# Patient Record
Sex: MALE | URBAN - METROPOLITAN AREA
[De-identification: names, ages, dates, MRNs, and addresses within clinical notes are randomized per-mention and may not be internally consistent; named-entity substitution may affect disease eponyms.]

---

## 2022-02-23 ENCOUNTER — OFFICE VISIT (OUTPATIENT)
Dept: URBAN - METROPOLITAN AREA CLINIC 24 | Facility: CLINIC | Age: 52
End: 2022-02-23
Payer: COMMERCIAL

## 2022-02-23 DIAGNOSIS — H25.13 AGE-RELATED NUCLEAR CATARACT, BILATERAL: ICD-10-CM

## 2022-02-23 DIAGNOSIS — H40.013 OPEN ANGLE WITH BORDERLINE FINDINGS, LOW RISK, BILATERAL: Primary | ICD-10-CM

## 2022-02-23 DIAGNOSIS — H40.1131 PRIMARY OPEN-ANGLE GLAUCOMA, BILATERAL, MILD STAGE: ICD-10-CM

## 2022-02-23 PROCEDURE — 99204 OFFICE O/P NEW MOD 45 MIN: CPT | Performed by: OPHTHALMOLOGY

## 2022-02-23 PROCEDURE — 92020 GONIOSCOPY: CPT | Performed by: OPHTHALMOLOGY

## 2022-02-23 PROCEDURE — 76514 ECHO EXAM OF EYE THICKNESS: CPT | Performed by: OPHTHALMOLOGY

## 2022-02-23 PROCEDURE — 92133 CPTRZD OPH DX IMG PST SGM ON: CPT | Performed by: OPHTHALMOLOGY

## 2022-02-23 ASSESSMENT — INTRAOCULAR PRESSURE
OS: 17
OD: 17

## 2022-02-23 NOTE — IMPRESSION/PLAN
Impression: Open angle with borderline findings, low risk, bilateral: H40.013. Plan: Pt has Glaucoma    Gonio :  ss 2+ ou      Pachs:  [phone]/585    Today's IOP :  17/17       Tmax  :  20/20 Target IOP mid teens or less ou Pt denies Fhx of Glaucoma Right eye is the better seeing eye HVF: overall full ou 02/23/22 C/D:  .45x.45 // .5x.5
OCT: 100/99 02/23/22 Pt denies Sulfa Allergy // Pt denies Lung /Heart dx Plan :
1. IOP and condition appear stable today. No changes being made to current regimen. Recommend monitoring condition at this time. 2. RTC in 1 year with optom for full CE with HVF/RNFL 3.  Seeing [facility] for optical needs